# Patient Record
Sex: MALE | Race: WHITE | Employment: STUDENT | URBAN - METROPOLITAN AREA
[De-identification: names, ages, dates, MRNs, and addresses within clinical notes are randomized per-mention and may not be internally consistent; named-entity substitution may affect disease eponyms.]

---
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
## 2024-01-16 ENCOUNTER — TELEMEDICINE (OUTPATIENT)
Dept: FAMILY MEDICINE CLINIC | Facility: CLINIC | Age: 19
End: 2024-01-16
Payer: MEDICAID

## 2024-01-16 DIAGNOSIS — G47.00 INSOMNIA, UNSPECIFIED TYPE: Primary | ICD-10-CM

## 2024-01-16 PROCEDURE — 99213 OFFICE O/P EST LOW 20 MIN: CPT | Performed by: FAMILY MEDICINE

## 2024-01-16 RX ORDER — TRAZODONE HYDROCHLORIDE 100 MG/1
100 TABLET ORAL NIGHTLY
Qty: 90 TABLET | Refills: 0 | Status: SHIPPED | OUTPATIENT
Start: 2024-01-16 | End: 2024-01-16

## 2024-01-16 RX ORDER — TRAZODONE HYDROCHLORIDE 50 MG/1
50 TABLET ORAL NIGHTLY
Qty: 90 TABLET | Refills: 0 | Status: SHIPPED | OUTPATIENT
Start: 2024-01-16 | End: 2024-04-15

## 2024-01-16 NOTE — PROGRESS NOTES
This appointment is conducted via telehealth with live audio and video.     Subjective:   Lucas Zuñiga is a 18 year old male who presents for refill on sleeping medication. Patient states that he takes 50 mg trazodone nightly to help with sleep. Patient states that he does not have any unwanted side effects.       History/Other:    No Further Nursing Notes to Review  Tobacco Reviewed  Allergies   Reviewed  Medications Reviewed  Problem List Reviewed  Medical History   Reviewed  Surgical History Reviewed  Family History Reviewed         Tobacco:  He has never smoked tobacco.    Current Outpatient Medications   Medication Sig Dispense Refill    traZODone 50 MG Oral Tab Take 1 tablet (50 mg total) by mouth nightly. 90 tablet 0    fexofenadine 180 MG Oral Tab Take 1 tablet (180 mg total) by mouth daily.      Multiple Vitamin (MULTI-DAY) Oral Tab Take  by mouth.           Review of Systems:  Review of Systems   Constitutional: Negative.    HENT: Negative.     Eyes: Negative.    Respiratory: Negative.     Cardiovascular: Negative.    Gastrointestinal: Negative.    Genitourinary: Negative.    Musculoskeletal: Negative.    Skin: Negative.    Neurological: Negative.    Psychiatric/Behavioral:  Positive for sleep disturbance.          Objective:   There were no vitals taken for this visit. Estimated body mass index is 25.18 kg/m² as calculated from the following:    Height as of 9/5/23: 5' 6\" (1.676 m).    Weight as of 9/5/23: 156 lb (70.8 kg).  Physical Exam  Constitutional:       Appearance: Normal appearance. He is well-developed.   HENT:      Head: Normocephalic and atraumatic.      Nose: No congestion or rhinorrhea.      Mouth/Throat:      Mouth: Mucous membranes are moist.      Pharynx: Oropharynx is clear.   Eyes:      Conjunctiva/sclera: Conjunctivae normal.   Cardiovascular:      Rate and Rhythm: Normal rate.   Pulmonary:      Effort: Pulmonary effort is normal.   Musculoskeletal:         General:  Normal range of motion.      Cervical back: Normal range of motion.   Skin:     General: Skin is warm and dry.   Neurological:      Mental Status: He is alert and oriented to person, place, and time.      Deep Tendon Reflexes: Reflexes are normal and symmetric.   Psychiatric:         Mood and Affect: Mood normal.         Behavior: Behavior normal.         Thought Content: Thought content normal.         Judgment: Judgment normal.           Assessment & Plan:   1. Insomnia, unspecified type (Primary)  -     traZODone HCl; Take 1 tablet (50 mg total) by mouth nightly.  Dispense: 90 tablet; Refill: 0  Take as prescribed.   Alcohol may have additive effects with this medication do not drink excessively while taking this medication.   Take when you can get a full 8 hours of sleep to reduce the groggy feeling in the morning.     DIANA Lombardo, 1/16/2024, 8:21 AM

## 2024-02-06 ENCOUNTER — HOSPITAL ENCOUNTER (EMERGENCY)
Facility: HOSPITAL | Age: 19
Discharge: HOME | End: 2024-02-06
Attending: PEDIATRICS
Payer: MEDICAID

## 2024-02-06 VITALS
BODY MASS INDEX: 26 KG/M2 | TEMPERATURE: 98 F | RESPIRATION RATE: 18 BRPM | SYSTOLIC BLOOD PRESSURE: 134 MMHG | DIASTOLIC BLOOD PRESSURE: 80 MMHG | HEART RATE: 75 BPM | WEIGHT: 160.06 LBS | OXYGEN SATURATION: 100 %

## 2024-02-06 DIAGNOSIS — K64.9 HEMORRHOIDS, UNSPECIFIED HEMORRHOID TYPE: Primary | ICD-10-CM

## 2024-02-06 PROCEDURE — 99283 EMERGENCY DEPT VISIT LOW MDM: CPT

## 2024-02-06 PROCEDURE — 99282 EMERGENCY DEPT VISIT SF MDM: CPT

## 2024-02-06 NOTE — ED INITIAL ASSESSMENT (HPI)
Last September, patient reports he had a lump that developed around his rectum that spontaneously resolved.  Around 3 days ago, he reports the lump again became more prominent and painful.  It has been bothering him since.  He does report recently he has been having small and infrequent bowel movements, but denies having to strain to push them out.      He also complains of increased dryness and irritation to his eyes, but also reports he just started college for computer science and has as a result been spending more time looking at screens.

## 2024-02-07 NOTE — ED PROVIDER NOTES
Patient Seen in: [facility] Emergency Department      History     Chief Complaint   Patient presents with    Anal Problem     Stated Complaint: Rectal pain    Subjective:   HPI    Patient is a 18-year-old male here with complaint of anal pain.  He states that he feels a lump near his anus.  He states this has happened to him before.  About 5 months ago he was seen by his PMD who thought he had some sort of soft mass near his anus and ordered an ultrasound.  Patient never got this done.  He has noted that over the past few days he feels that lump again and it is coming with straining of his stool.  He denies nacho blood but every now and then notes a little bit of bright red blood on the toilet paper    Objective:   Past Medical History:   Diagnosis Date    Allergic rhinitis     Anxiety     Depression               History reviewed. No pertinent surgical history.             Social History     Socioeconomic History    Marital status: Single   Tobacco Use    Smoking status: Never    Smokeless tobacco: Never   Vaping Use    Vaping Use: Never used   Substance and Sexual Activity    Alcohol use: Never    Drug use: Never   Other Topics Concern    Caffeine Concern No    Exercise No    Seat Belt No    Special Diet No    Stress Concern Yes     Comment: College :)    Weight Concern No              Review of Systems    Positive for stated complaint: Rectal pain  Other systems are as noted in HPI.  Constitutional and vital signs reviewed.      All other systems reviewed and negative except as noted above.    Physical Exam     ED Triage Vitals   BP 02/06/24 1019 134/80   Pulse 02/06/24 1019 79   Resp 02/06/24 1019 20   Temp 02/06/24 1019 98.2 °F (36.8 °C)   Temp src 02/06/24 1019 Temporal   SpO2 02/06/24 1019 100 %   O2 Device 02/06/24 1113 None (Room air)       Current:/80   Pulse 75   Temp 98.2 °F (36.8 °C) (Temporal)   Resp 18   Wt 72.6 kg   SpO2 100%   BMI 25.83 kg/m²         Physical Exam  HEENT: The  pupils are equal round and react to light, oropharynx is clear, mucous membranes are moist.  Ears:left TM shows no erythema, right TM shows no erythema   Neck: Supple, full range of motion.  CV: Chest is clear to auscultation, no wheezes rales or rhonchi.  Cardiac exam normal S1-S2, no murmurs rubs or gallops.  Abdomen: Soft, nontender, nondistended.  Bowel sounds present throughout.  Extremities: Warm and well perfused.  Dermatologic exam: No rashes or lesions.  Neurologic exam: Cranial nerves 2-12 grossly intact.    Orthopedic exam: normal,from.    : External hemorrhoid noted at the 6 o'clock position.  No bleeding no firm palpable mass    ED Course   Labs Reviewed - No data to display          Vitals reviewed within normal limits.  Pulse 79 normal for age         MDM      Patient presents with pain near the anus.  Differential considered includes hemorrhoid versus abscess.  He has a clear hemorrhoid.  This does not require ultrasound.  He will be treated with stool softeners.  If symptoms worsen he is to follow close with the PMD or return for worsening of symptoms to the ED    Patient was screened and evaluated during this visit.   As a treating physician attending to the patient, I determined, within reasonable clinical confidence and prior to discharge, that an emergency medical condition was not or was no longer present.  There was no indication for further evaluation, treatment or admission on an emergency basis.  Comprehensive verbal and written discharge and follow-up instructions were provided to help prevent relapse or worsening.  Patient was instructed to follow-up with the primary care provider for further evaluation and treatment, but to return immediately to the ER for worsening, concerning, new, changing or persisting symptoms.  I discussed the case with the patient/parent and they had no questions, complaints, or concerns.  Patient/parent felt comfortable going home.                                Medical Decision Making      Disposition and Plan     Clinical Impression:  1. Hemorrhoids, unspecified hemorrhoid type         Disposition:  Discharge  2/6/2024 10:33 am    Follow-up:  No follow-up provider specified.        Medications Prescribed:  Discharge Medication List as of 2/6/2024 11:14 AM

## 2024-02-16 ENCOUNTER — TELEMEDICINE (OUTPATIENT)
Dept: FAMILY MEDICINE CLINIC | Facility: CLINIC | Age: 19
End: 2024-02-16
Payer: MEDICAID

## 2024-02-16 DIAGNOSIS — J30.9 ALLERGIC RHINITIS, UNSPECIFIED SEASONALITY, UNSPECIFIED TRIGGER: ICD-10-CM

## 2024-02-16 DIAGNOSIS — R41.89 BRAIN FOG: ICD-10-CM

## 2024-02-16 DIAGNOSIS — R53.83 FATIGUE, UNSPECIFIED TYPE: ICD-10-CM

## 2024-02-16 DIAGNOSIS — G47.00 INSOMNIA, UNSPECIFIED TYPE: Primary | ICD-10-CM

## 2024-02-17 NOTE — PROGRESS NOTES
This is a telemedicine visit with live, interactive video and/or audio.     Patient understands and accepts financial responsibility for any deductible, co-insurance and/or co-pays associated with this service.    SUBJECTIVE  This is a 18 year old male patient is concerned that he has a \"brain tumor because I have several of the symptoms related to brain tumors.\" Patient listed a dozen symptoms that included poor balance, forgetfulness, stuttering, anxiety, memory loss, confusion. Patient states that he is also having headaches, brain fog, fatigue and difficulty sleeping. Patient was given resources previously for neuropsych evaluation but the provider given did not accept his insurance. Patient states that he is still interested in this evaluation.     Results for orders placed or performed in visit on 08/17/23   Adult Food Allergy Prof [E]   Result Value Ref Range    Allergen Clam <0.10 <0.10 kUA/L    Allergen Corn <0.10 <0.10 kUA/L    Allergen Egg White <0.10 <0.10 kUA/L    Allergen Fish <0.10 <0.10 kUA/L    Allergen Milk <0.10 <0.10 kUA/L    Allergen Peanut <0.10 <0.10 kUA/L    Allergen Scallop <0.10 <0.10 kUA/L    Allergen Sesame Seed <0.10 <0.10 kUA/L    Allergen Shrimp <0.10 <0.10 kUA/L    Allergen Soybean <0.10 <0.10 kUA/L    Allergen [place] <0.10 <0.10 kUA/L    Allergen Wheat <0.10 <0.10 kUA/L    Immunoglobulin E 55.70 2.00 - 214.00 kU/L   Allergens, Zone 8 [E]   Result Value Ref Range    Allergen A. Alternata 3.87 (H) <0.10 kUA/L    Allergen Aspergillus Fumigatus <0.10 <0.10 kUA/L    Allergen Bermuda Grass <0.10 <0.10 kUA/L    Allergen Box Elder 0.13 (H) <0.10 kUA/L    Allergen C. Herbarum <0.10 <0.10 kUA/L    Allergen Cat Dander 0.47 (H) <0.10 kUA/L    Allergen Cockroach <0.10 <0.10 kUA/L    Allergen Common Pigweed <0.10 <0.10 kUA/L    Allergen Common Ragweed 5.86 (H) <0.10 kUA/L    Allergen [place] Tree <0.10 <0.10 kUA/L    Allergen D. Farinae <0.10 <0.10 kUA/L    Allergen D. Pteronyssinus <0.10 <0.10  kUA/L    Allergen Dog Dander <0.10 <0.10 kUA/L    Allergen Elm Tree <0.10 <0.10 kUA/L    Allergen Marsh Elder 0.25 (H) <0.10 kUA/L    Allergen Mouse Epithelium <0.10 <0.10 kUA/L    Allergen [place] <0.10 <0.10 kUA/L    Allergen Mucor Racemosus <0.10 <0.10 kUA/L    Allergen [place] <0.10 <0.10 kUA/L    Allergen [place] Tree 0.22 (H) <0.10 kUA/L    Allergen Pecan/Hickory 0.13 (H) <0.10 kUA/L    Allergen Penicillium Notatum <0.10 <0.10 kUA/L    Allergen Russian Thistle <0.10 <0.10 kUA/L    Allergen [place] <0.10 <0.10 kUA/L    Allergen Ashish Grass <0.10 <0.10 kUA/L    Allergen [place] Tree <0.10 <0.10 kUA/L    Allergen White [place] 0.22 (H) <0.10 kUA/L    Immunoglobulin E 52.50 2.00 - 214.00 kU/L   Comp Metabolic Panel (14)   Result Value Ref Range    Glucose 86 70 - 99 mg/dL    Sodium 137 136 - 145 mmol/L    Potassium 3.2 (L) 3.5 - 5.1 mmol/L    Chloride 104 98 - 112 mmol/L    CO2 26.0 21.0 - 32.0 mmol/L    Anion Gap 7 0 - 18 mmol/L    BUN 13 7 - 18 mg/dL    Creatinine 1.21 (H) 0.50 - 1.00 mg/dL    Calcium, Total 9.9 8.5 - 10.1 mg/dL    Calculated Osmolality 283 275 - 295 mOsm/kg    eGFR-Cr 89 >=60 mL/min/1.73m2    AST 16 15 - 37 U/L    ALT 19 16 - 61 U/L    Alkaline Phosphatase 64 52 - 222 U/L    Bilirubin, Total 1.6 0.1 - 2.0 mg/dL    Total Protein 7.4 6.4 - 8.2 g/dL    Albumin 4.6 3.4 - 5.0 g/dL    Globulin  2.8 2.8 - 4.4 g/dL    A/G Ratio 1.6 1.0 - 2.0    Patient Fasting for CMP? Yes    Lipid Panel   Result Value Ref Range    Cholesterol, Total 151 <200 mg/dL    HDL Cholesterol 67 (H) 40 - 59 mg/dL    Triglycerides 73 30 - 149 mg/dL    LDL Cholesterol 70 <100 mg/dL    VLDL 11 0 - 30 mg/dL    Non HDL Chol 84 <130 mg/dL    Patient Fasting for Lipid? Yes    Hemoglobin A1C   Result Value Ref Range    HgbA1C 5.1 <5.7 %    Estimated Average Glucose 100 68 - 126 mg/dL   Iron And Tibc   Result Value Ref Range    Iron 122 65 - 175 ug/dL    Transferrin 263 200 - 360 mg/dL    Total Iron Binding Capacity 392 240 - 450  ug/dL    % Saturation 31 20 - 50 %   Ferritin   Result Value Ref Range    Ferritin 40.2 20.0 - 155.0 ng/mL   Vitamin B12   Result Value Ref Range    Vitamin B12 318 193 - 986 pg/mL   Folic Acid Serum (Folate)   Result Value Ref Range    Folate (Folic Acid) 23.6 >=8.7 ng/mL   Histamine, Whole Blood   Result Value Ref Range    Histamine Determination, Blood 61 12 - 127 ng/mL   C-Reactive Protein   Result Value Ref Range    C-Reactive Protein <0.29 <0.30 mg/dL   Cortisol [E]   Result Value Ref Range    Cortisol 6.5 ug/dL   Testosterone, Total Free, Male [E]   Result Value Ref Range    Free Testost Direct 10.2 Not Estab. pg/mL    Testosterone 495 150 - 785 ng/dL   Estrogens Fractionated, S [E]   Result Value Ref Range    Estrone 76 pg/mL    Estradiol 17.7 7.6 - 42.6 pg/mL   Vitamin D, 25-Hydroxy   Result Value Ref Range    Vitamin D, 25OH, Total 20.6 (L) 30.0 - 100.0 ng/mL   CBC W/ DIFFERENTIAL   Result Value Ref Range    WBC 5.7 4.0 - 11.0 x10(3) uL    RBC 5.49 4.30 - 5.70 x10(6)uL    HGB 17.0 13.0 - 17.5 g/dL    HCT 49.0 39.0 - 53.0 %    .0 150.0 - 450.0 10(3)uL    MCV 89.3 80.0 - 100.0 fL    MCH 31.0 26.0 - 34.0 pg    MCHC 34.7 31.0 - 37.0 g/dL    RDW 11.9 %    Neutrophil Absolute Prelim 2.78 1.50 - 7.70 x10 (3) uL    Neutrophil Absolute 2.78 1.50 - 7.70 x10(3) uL    Lymphocyte Absolute 2.05 1.50 - 5.00 x10(3) uL    Monocyte Absolute 0.59 0.10 - 1.00 x10(3) uL    Eosinophil Absolute 0.17 0.00 - 0.70 x10(3) uL    Basophil Absolute 0.05 0.00 - 0.20 x10(3) uL    Immature Granulocyte Absolute 0.01 0.00 - 1.00 x10(3) uL    Neutrophil % 49.2 %    Lymphocyte % 36.3 %    Monocyte % 10.4 %    Eosinophil % 3.0 %    Basophil % 0.9 %    Immature Granulocyte % 0.2 %       HISTORY:  Past Medical History:   Diagnosis Date    Allergic rhinitis     Anxiety     Depression       History reviewed. No pertinent surgical history.   Family History   Problem Relation Age of Onset    Cancer Mother     Depression Mother       Social  History     Socioeconomic History    Marital status: Single   Tobacco Use    Smoking status: Never    Smokeless tobacco: Never   Vaping Use    Vaping Use: Never used   Substance and Sexual Activity    Alcohol use: Never    Drug use: Never   Other Topics Concern    Caffeine Concern No    Exercise No    Seat Belt No    Special Diet No    Stress Concern Yes     Comment: College :)    Weight Concern No        Allergies   Allergen Reactions    Seasonal OTHER (SEE COMMENTS)      Current Outpatient Medications   Medication Sig Dispense Refill    traZODone 50 MG Oral Tab Take 1 tablet (50 mg total) by mouth nightly. 90 tablet 0    fexofenadine 180 MG Oral Tab Take 1 tablet (180 mg total) by mouth daily.      Multiple Vitamin (MULTI-DAY) Oral Tab Take  by mouth.         OBJECTIVE  Physical Exam:   Speaking in full sentences comfortably and Normal work of breathing    ASSESSMENT & PLAN  Diagnoses and all orders for this visit:    Insomnia, unspecified type  -     Neuro Referral - In Network    Brain fog  -     Neuro Referral - In Network    Fatigue, unspecified type  -     Neuro Referral - In Network    Allergic rhinitis, unspecified seasonality, unspecified trigger    Start daily antihistamine: Claritin, Zyrtec, Allegra are all good options. Generic is fine.  Use nasal spray daily  Avoid allergy triggers  Follow up if symptoms worsen or new onset of fever  What Causes Springtime Allergies?   Spring allergies are a result of pollen from trees, which can start pollinating anytime from January to April, depending on the climate and location. Trees that are known to cause severe allergies include oak, olive, elm, birch, kimberly, hickory, poplar, sycamore, maple, cypress, and walnut. In some areas of the world, some weeds will also pollinate in the springtime.   What Causes Summertime Allergies?   Grass pollen is typically the main cause of late spring and early summer allergies. Grass pollen is highest at these times. However, grass  may cause allergies through much of the year if someone is mowing the lawn or lying in the grass. Contact with grass can result in itching and hives in people who are allergic to grass pollen, this is called contact urticaria.   Grasses can be divided into two major classes, northern and southern grasses. Northern grasses are common in colder climates, and include tano, rye, orchard, sweet vernal, red top, and blue grasses. Southern grasses are present in warmer climates, with Bermuda grass being the major grass in this category.   What Causes Fall Allergies?   Weed pollen is the main cause of seasonal allergy in the late summer and early fall. Depending on the area of [place], these weeds include ragweed, sagebrush, pigweed, tumbleweed (Russian thistle), and cocklebur. In some areas of the world, some trees can pollinate in the fall as well.   How Do I Know What Pollens are Present?   In most areas, pollen is measured and counted, with the different types of pollen identified. This may be reported in terms of trees, weeds and grasses, or may be further divided into the types of trees and weeds identified. Specific grasses are not usually identified on pollen counts, as grasses look the same under a microscope.   How Do I Know Which Pollens I am Allergic To?   An allergist can help determine if you have seasonal allergies, and to which types of pollens to which you are allergic. This is accomplished through allergy testing, which typically involves skin testing or a blood test (RAST). Allergy testing can be helpful in predicting the times of the year that you are likely to experience allergy symptoms and is needed if you are interesting in taking allergy shots.   How Can I Avoid Pollen Exposure?   Unlike avoidance of pet dander and dust mites, it is more difficult to avoid exposure to pollens, since it is present in the outdoor air. Here are some tips to minimize pollen exposure:   Keep windows closed  prevent pollens from drifting into your home   Minimize early morning activity when pollen is usually emitted between 5-10 a.m.   Keep your car windows closed when traveling.   Stay indoors when the pollen count is reported to be high, and on windy days when pollen may be present in higher amounts in the air   Take a vacation during the height of the pollen season to a more pollen-free area, such as the beach or sea.   Avoid freshly cut grass and mowing the lawn   Machine dry bedding and clothing. Pollen may collect in laundry if it is hung outside to dry   Source: American Academy of Allergy, Asthma and Immunology.  http://www.aaaai.org/patients/publicedmat/tips/outdoorallergens.stm.   DISCLAIMER: The information contained in this article is for educational purposes only, and should not be used as a substitute for personal care by a licensed physician. Please see your physician for diagnosis and treatment of any concerning symptoms or medical condition.      Follow Up:  As needed.    Plan discussed with patient. All questions answered. Patient is agreeable to this plan of care.     Time spent on telephone call and encounter 25 min.       DIANA Lombardo

## 2024-03-11 ENCOUNTER — OFFICE VISIT (OUTPATIENT)
Dept: FAMILY MEDICINE CLINIC | Facility: CLINIC | Age: 19
End: 2024-03-11
Payer: MEDICAID

## 2024-03-11 VITALS
HEART RATE: 67 BPM | RESPIRATION RATE: 16 BRPM | DIASTOLIC BLOOD PRESSURE: 74 MMHG | WEIGHT: 157 LBS | BODY MASS INDEX: 25.23 KG/M2 | SYSTOLIC BLOOD PRESSURE: 100 MMHG | TEMPERATURE: 98 F | HEIGHT: 66 IN | OXYGEN SATURATION: 97 %

## 2024-03-11 DIAGNOSIS — Z00.00 WELL ADULT EXAM: Primary | ICD-10-CM

## 2024-03-11 DIAGNOSIS — F51.01 PRIMARY INSOMNIA: ICD-10-CM

## 2024-03-11 DIAGNOSIS — F32.A ANXIETY AND DEPRESSION: ICD-10-CM

## 2024-03-11 DIAGNOSIS — R41.89 BRAIN FOG: ICD-10-CM

## 2024-03-11 DIAGNOSIS — F41.9 ANXIETY AND DEPRESSION: ICD-10-CM

## 2024-03-11 PROBLEM — J30.2 SEASONAL ALLERGIES: Status: ACTIVE | Noted: 2022-08-03

## 2024-03-11 PROBLEM — F33.1 MAJOR DEPRESSIVE DISORDER, RECURRENT EPISODE, MODERATE DEGREE (HCC): Status: ACTIVE | Noted: 2017-01-27

## 2024-03-11 PROBLEM — R41.840 ATTENTION DISTURBANCE: Status: ACTIVE | Noted: 2022-08-03

## 2024-03-11 RX ORDER — HYDROXYZINE 50 MG/1
50 TABLET, FILM COATED ORAL NIGHTLY PRN
Qty: 90 TABLET | Refills: 0 | Status: SHIPPED | OUTPATIENT
Start: 2024-03-11 | End: 2024-06-09

## 2024-03-11 NOTE — PROGRESS NOTES
Note to patient: The 21st Century Cures Act makes medical notes like these available to patients in the interest of transparency. However, be advised this is a medical document. It is intended as peer to peer communication. It is written in medical language and may contain abbreviations or verbiage that are unfamiliar. It may appear blunt or direct. Medical documents are intended to carry relevant information, facts as evident, and the clinical opinion of the practitioner.         Chief Complaint   Patient presents with    Physical       HPI:  Patient here for annual physical.     Sleeping issues - Trazodone often causes him to wake up with mild chest pain. He tried trazodone consistently for the past two months. Took it on and off for past 6 months. He does not fall asleep after taking it sometimes. Notices that when he does take it he has-Right arm often goes numb overnight, despite positioning. Does not notice it when laying flat, but if arms are elevated there is numbness.     Brain fog - poor concentration, short/long term memory, forgets train of thought, poor interpretation skills (whether what is said or situations), manually functions in head (no \"autopilot\"), talks himself through problems vs things just happening, zoning out often. Notices these symptoms daily, but they vary in severity. Neuro referral in place, will call today to set up an appointment.     Digestive issues - recurring trend for past 4-5 years, sometimes will have bad diarrhea. \"Eggy\" burps, alternating constipation. Denies nausea or bloating. Pain in stomach only with constipation. Drinks plenty of water. Relies mostly on dining halls for foods, chooses vegetables whenever they are available.     He does have axiety/depression. He has been on medications before but reports it had no effect on him. He tried wellbutrin, zoloft, lexapro, venlafaxine, strattera. He had no positive benefit from it. He was seeing psychiatrist.     Sees  therapist weekly. Therapist thinks brain fog is related to anxiety.    PMHx: sleeping issues, allergies   PSHx: none  FMHx:  -maternal: breast cancer   -paternal: unknown  Smoking: none  Alcohol: occasionally   Drugs: none   Sexual hx: not active   STD hx: declined  Occupation: student, [employer]  Colonoscopy: no fmhx  Tdap: due 7/2025  Diet: dining halls at college, [employer]  Exercise: not often    PHQ-9 TOTAL SCORE: 18  , done 3/11/2024   Little interest or pleasure in doing things: 3    Feeling down, depressed, or hopeless: 3    Trouble falling or staying asleep, or sleeping too much: 3     Feeling tired or having little energy: 3    Feeling bad about yourself - or that you are a failure or have let yourself or your family down: 3    Trouble concentrating on things, such as reading the newspaper or watching television: 3    If you checked off any problems, how difficult have these problems made it for you to do your work, take care of things at home, or get along with other people?: Very difficult    [facility] Suicide Screening on 3/11/2024 was No Risk.      Review of Systems   Constitutional: Negative for fever, chills. No distress. Reports fatigue.   HENT: Negative for hearing loss, congestion, sore throat, neck pain   Eyes: Negative for pain and visual disturbance.   Respiratory: Negative for cough, chest tightness, shortness of breath and wheezing.    Cardiovascular: Negative for chest pain, palpitations and leg swelling.   Gastrointestinal: Negative for nausea, vomiting, abdominal pain, blood in stool and abdominal distention. Alternating constipation/diarrhea.  Genitourinary: Negative for dysuria, hematuria and difficulty urinating.   Musculoskeletal: Negative for myalgias, back pain, joint swelling, arthralgias and gait problem.   Skin: Negative for color change and rash.   Neurological: Negative for dizziness, syncope, weakness, tingling and headaches.     Patient Active Problem List   Diagnosis     Anxiety and depression    Generalized anxiety disorder    Attention disturbance    Major depressive disorder, recurrent episode, moderate degree (HCC)    Seasonal allergies       Past Medical History:   Diagnosis Date    Allergic rhinitis     Anxiety     Depression      History reviewed. No pertinent surgical history.  Family History   Problem Relation Age of Onset    Cancer Mother     Depression Mother      Social History     Socioeconomic History    Marital status: Single   Tobacco Use    Smoking status: Never    Smokeless tobacco: Never   Vaping Use    Vaping Use: Never used   Substance and Sexual Activity    Alcohol use: Not Currently    Drug use: Never   Other Topics Concern    Caffeine Concern No    Exercise No    Seat Belt No    Special Diet No    Stress Concern Yes     Comment: College :)    Weight Concern No       Current Outpatient Medications   Medication Sig Dispense Refill    hydrOXYzine 50 MG Oral Tab Take 1 tablet (50 mg total) by mouth nightly as needed for Anxiety (insomnia). 90 tablet 0    fexofenadine 180 MG Oral Tab Take 1 tablet (180 mg total) by mouth daily.      Multiple Vitamin (MULTI-DAY) Oral Tab Take  by mouth.         Allergies  Allergies   Allergen Reactions    Seasonal OTHER (SEE COMMENTS)       Health Maintenance  Immunizations:  Immunization History   Administered Date(s) Administered    Covid-19 Vaccine Pfizer 30 mcg/0.3 ml 08/22/2021, 09/12/2021    DTAP INFANRIX 11/08/2004, 01/07/2005, 03/07/2005, 12/19/2005, 01/19/2010    FLUZONE 6 months and older PFS 0.5 ml (53465) 10/07/2020    HEP A,Ped/Adol,(2 Dose) 07/15/2015, 07/20/2016    HIB 3 Dose Schedule 11/08/2004, 01/07/2005, 09/06/2005    Hep B, Unspecified Formulation 11/08/2004, 01/07/2005, 09/06/2005    Hpv Virus Vaccine 9 Laurie Im 07/20/2016, 07/26/2017    IPV 11/08/2004, 01/07/2005, 09/06/2005, 01/19/2010    MMR 09/06/2005, 01/19/2010    Meningococcal Groups A,c,y,w Conjugate Vaccine 08/03/2022    Meningococcal-Menactra 07/20/2016     Pneumococcal (Prevnar 7) 01/07/2005, 03/07/2005, 06/13/2005, 09/06/2005    TDAP 07/15/2015    Varicella 12/19/2005, 01/19/2010         Physical Exam  /74   Pulse 67   Temp 98 °F (36.7 °C) (Temporal)   Resp 16   Ht 5' 6\" (1.676 m)   Wt 157 lb (71.2 kg)   SpO2 97%   BMI 25.34 kg/m²   Constitutional: Oriented to person, place, and time. No distress.   HEENT:  Normocephalic and atraumatic. Hearing and tympanic membranes normal.  Nose normal. Oropharynx is clear and moist.   Eyes: Conjunctivae and EOM are normal. PERRLA. No scleral icterus.   Neck:  No mass and no thyromegaly present.   Cardiovascular: Normal rate, regular rhythm and intact distal pulses.  No murmur, rubs or gallops.   Pulmonary/Chest: Effort normal and breath sounds normal. No respiratory distress. No wheezes, rhonchi or rales  Abdominal: Soft. Bowel sounds are normal. Non tender, no masses, no organomegaly or hernias.  Musculoskeletal: No edema and no tenderness.    Neurological: grossly normal   Skin: Skin is warm and dry.  Psychiatric: Normal mood and affect.     A/P:    1. Brain fog  Patient had previously gotten a referral for Neuro, he plans to call today to schedule an appointment. Would recommend neuropsych testing for ADHD and similar conditions.     - [facility] Referral - In Network    2. Well adult exam  Encouraged healthy diet and physical activity. Counseled on safe sex, screening guidelines, and vaccine recommendations.     3. Primary insomnia  He has been taking trazodone, however due to the side effects of chest pain and inconsistency will adjust medication. He also has anxiety so will do a trial of hydroxyzine and see how this works for him.    - hydrOXYzine 50 MG Oral Tab; Take 1 tablet (50 mg total) by mouth nightly as needed for Anxiety (insomnia).  Dispense: 90 tablet; Refill: 0    4. Anxiety and depression  Referral in place for [facility]. Patient has tried many anti-depressants and did not feel  any benefit from them. Would like their input on medication recommendations.     - [facility] Referral - In Network      Encounter Diagnoses   Name Primary?    Brain fog     Well adult exam Yes    Primary insomnia     Anxiety and depression        No orders of the defined types were placed in this encounter.      Meds & Refills for this Visit:  Requested Prescriptions     Signed Prescriptions Disp Refills    hydrOXYzine 50 MG Oral Tab 90 tablet 0     Sig: Take 1 tablet (50 mg total) by mouth nightly as needed for Anxiety (insomnia).       Imaging & Consults:  [facility] - INTERNAL      No follow-ups on file.    There are no Patient Instructions on file for this visit.    All questions were answered and the patient understands the plan.     LALY Galvez      I have personally seen and examined the patient, I have reviewed the PA student's examination, notes, and agree with the assessment and plan.        Betty Almanzar,         This note was prepared using Dragon Medical voice recognition dictation software. As a result errors may occur. When identified these errors have been corrected. While every attempt is made to correct errors during dictation discrepancies may still exist.      Note to patient: The 21st Century Cures Act makes medical notes like these available to patients in the interest of transparency. However, be advised this is a medical document. It is intended as peer to peer communication. It is written in medical language and may contain abbreviations or verbiage that are unfamiliar. It may appear blunt or direct. Medical documents are intended to carry relevant information, facts as evident, and the clinical opinion of the practitioner.

## 2024-03-15 ENCOUNTER — TELEPHONE (OUTPATIENT)
Age: 19
End: 2024-03-15

## 2024-03-15 NOTE — TELEPHONE ENCOUNTER
Juan Alvarado MD  [facility]  [address], [address], [place], [address]  Phone: [phone]    NELLIE French  [facility]  [address] [place], [place], [address]  Phone: [phone]    Boby Scott MD  [address], [address]  Phone: [phone]    KELLEN Larson  [facility]  [address], [address], [place], [place], [address]  Phone: [phone]   Purse String (Intermediate) Text: Given the location of the defect and the characteristics of the surrounding skin a purse string intermediate closure was deemed most appropriate.  Undermining was performed circumfirentially around the surgical defect.  A purse string suture was then placed and tightened.

## 2024-06-02 ENCOUNTER — HOSPITAL ENCOUNTER (OUTPATIENT)
Age: 19
Discharge: HOME | End: 2024-06-02
Payer: MEDICAID

## 2024-06-02 VITALS
SYSTOLIC BLOOD PRESSURE: 117 MMHG | DIASTOLIC BLOOD PRESSURE: 77 MMHG | HEART RATE: 65 BPM | OXYGEN SATURATION: 98 % | TEMPERATURE: 98 F | RESPIRATION RATE: 18 BRPM

## 2024-06-02 DIAGNOSIS — L03.119 CELLULITIS OF LOWER EXTREMITY, UNSPECIFIED LATERALITY: Primary | ICD-10-CM

## 2024-06-02 RX ORDER — CEPHALEXIN 500 MG/1
500 CAPSULE ORAL 3 TIMES DAILY
Qty: 21 CAPSULE | Refills: 0 | Status: SHIPPED | OUTPATIENT
Start: 2024-06-02 | End: 2024-06-09

## 2024-06-02 NOTE — DISCHARGE INSTRUCTIONS
Follow-up with [facility] tomorrow for further evaluation of your wound  Start the antibiotics 3 times daily for the next 7 days  Keep the area clean and dry  Return to the emergency room if any worsening symptoms or concerns

## 2024-06-02 NOTE — ED PROVIDER NOTES
Patient Seen in: [facility]      History     Chief Complaint   Patient presents with    Cellulitis     Stated Complaint: left leg suture check/post surgery    Subjective:   HPI    18-year-old male presents to the immediate care for a wound check of the left leg.  Patient had a procedure done at dermatology and asked the start to get infected has increased redness and swelling to the area.  Is post get the sutures removed tomorrow by the dermatology department.  No fever no drainage from the wound.  Patient is no other issues complaints concerns.  The patient's medication list, past medical history and social history elements as listed in today's nurse's notes were reviewed and agreed (except as otherwise stated in the HPI).  The patient's family history reviewed and determined to be noncontributory to the presenting problem.      Objective:   Past Medical History:    Allergic rhinitis    Anxiety    Depression              Past Surgical History:   Procedure Laterality Date    Skin biopsy Left 05/20/2024    mole removal on left lower leg- benign    [place] teeth removed Bilateral     2024                Social History     Socioeconomic History    Marital status: Single   Tobacco Use    Smoking status: Never    Smokeless tobacco: Never   Vaping Use    Vaping status: Never Used   Substance and Sexual Activity    Alcohol use: Not Currently    Drug use: Never   Other Topics Concern    Caffeine Concern No    Exercise No    Seat Belt No    Special Diet No    Stress Concern Yes     Comment: College :)    Weight Concern No     Social Determinants of Health     Financial Resource Strain: Not on File (10/7/2022)    Received from [facility]     Financial Resource Strain     Financial Resource Strain: 0   Food Insecurity: Not on File (10/7/2022)    Received from [facility]     Food Insecurity     Food: 0   Transportation Needs: Not on File (10/7/2022)    Received from [facility]     Transportation Needs      Transportation: 0   Physical Activity: Not on File (10/7/2022)    Received from [facility]     Physical Activity     Physical Activity: 0   Stress: Not on File (10/7/2022)    Received from [facility]     Stress     Stress: 0   Social Connections: Not on File (10/7/2022)    Received from [facility]     Social Connections     Social Connections and Isolation: 0    [facility] from [facility], [facility]    Housing Stability              Review of Systems    Positive for stated complaint: left leg suture check/post surgery  Other systems are as noted in HPI.  Constitutional and vital signs reviewed.      All other systems reviewed and negative except as noted above.    Physical Exam     ED Triage Vitals [06/02/24 1135]   /77   Pulse 65   Resp 18   Temp 98.4 °F (36.9 °C)   Temp src Temporal   SpO2 98 %   O2 Device None (Room air)       Current Vitals:   Vital Signs  BP: 117/77  Pulse: 65  Resp: 18  Temp: 98.4 °F (36.9 °C)  Temp src: Temporal    Oxygen Therapy  SpO2: 98 %  O2 Device: None (Room air)            Physical Exam    GENERAL: The patient is well-developed well-nourished nontoxic, non-ill-appearing  CHEST/LUNGS:  There is no respiratory distress noted.  HEART/CARDIOVASCULAR:  There is no tachycardia.   SKIN: Left medial calf there is a intact sutures with mild redness around the suture sites and increasing red streaks going proximal and distal to the sutures.  Area is warm to touch.  No drainage noted  NEURO: The patient is awake, alert, and oriented.  The patient is cooperative.    ED Course   Labs Reviewed - No data to display           MDM   Pertinent Labs & Imaging studies reviewed. (See chart for details)  Differential diagnosis considered but not limited to: Cellulitis, abscess, dehisced wound, normal wound check  Patient coming in with wound redness and swelling. Will treat for possible cellulitis. Will discharge on Keflex. Patient is comfortable with  this plan.  Overall Pt looks good. Non-toxic, well-hydrated and in no respiratory distress. Vital signs are reassuring. Exam is reassuring. I do not believe pt  requires and additional  diagnostic studiesor intervention. I believe pt  can be discharged home to continue evaluation as an outpatient. Follow-up provider given. Discharge instructions given and reviewed. Return for any problems. All understand and agreewith the plan.    Please note that this report has been produced using speech recognition software and may contain errors related to that system including, but not limited to, errors in grammar, punctuation, and spelling, as well as words and phrases that possibly may have been recognized inappropriately.  If there are any questions or concerns, contact the dictating provider for clarification.    Note to patient: The 21st Century Cures Act makes medical notes like these available to patients in the interest of transparency. However, this is a medical document intended as peer to peer communication. It is written in medical language and may contain abbreviations or verbiage that are unfamiliar. It may appear blunt or direct. Medical documents are intended to carry relevant information, facts as evident, and the clinical opinion of the practitioner.                                    Medical Decision Making      Disposition and Plan     Clinical Impression:  1. Cellulitis of lower extremity, unspecified laterality         Disposition:  Discharge  6/2/2024 11:51 am    Follow-up:  Betty Almanzar DO  [address] [address] [address]  [phone]                Medications Prescribed:  Discharge Medication List as of 6/2/2024 11:54 AM        START taking these medications    Details   cephalexin 500 MG Oral Cap Take 1 capsule (500 mg total) by mouth 3 (three) times daily for 7 days., Normal, Disp-21 capsule, R-0

## 2024-06-02 NOTE — ED INITIAL ASSESSMENT (HPI)
Patient had mole removed on 5/20/24 per derm. He is supposed to get the sutures removed tomorrow. He is concerned because there is redness that is increasing with increasing pain in the last 2 days. He is worried he may have an infection.

## 2024-07-18 ENCOUNTER — OFFICE VISIT (OUTPATIENT)
Dept: NEUROLOGY | Facility: CLINIC | Age: 19
End: 2024-07-18
Payer: MEDICAID

## 2024-07-18 VITALS
RESPIRATION RATE: 16 BRPM | SYSTOLIC BLOOD PRESSURE: 118 MMHG | DIASTOLIC BLOOD PRESSURE: 68 MMHG | HEART RATE: 115 BPM | BODY MASS INDEX: 26.49 KG/M2 | OXYGEN SATURATION: 98 % | WEIGHT: 159 LBS | HEIGHT: 65 IN

## 2024-07-18 DIAGNOSIS — R41.3 MEMORY LOSS: Primary | ICD-10-CM

## 2024-07-18 DIAGNOSIS — G47.00 INSOMNIA, UNSPECIFIED TYPE: ICD-10-CM

## 2024-07-18 PROCEDURE — 99204 OFFICE O/P NEW MOD 45 MIN: CPT | Performed by: OTHER

## 2024-07-18 RX ORDER — FINASTERIDE 1 MG/1
1 TABLET, FILM COATED ORAL DAILY
COMMUNITY
Start: 2024-06-21 | End: 2024-09-19

## 2024-07-18 NOTE — PROGRESS NOTES
Neurology History & Physical     ASSESSMENT & PLAN:      ICD-10-CM    1. Memory loss  R41.3 PSYCHOLOGY - INTERNAL      2. Insomnia, unspecified type  G47.00 PULMONARY - INTERNAL        Chronic, relatively static \"brain fog\" - memory, concentration, word finding difficulties.  He is still doing pretty well in school.  Labs unremarkable.  No indication for neuroimaging.  Obtain neuropsych testing, and I advised them to get results from 4-5 years ago to compare.    Chronic insomnia.  Sleep evaluation.    Follow up 3-4 weeks after neuropsych testing.    No follow-ups on file.       ~~~~~~~~~~~~~~~~~~~~~~~~~~~    CHIEF COMPLAINT / REASON FOR VISIT:    Chief Complaint   Patient presents with    Neurologic Problem     PT stated he has had brain fog for the past 5 years he feels as if he is always zoned   Memory not so great he stated he has trouble understanding what people are saying sometimes.     HISTORY OBTAINED FROM:  Patient and mom  Chart review    HISTORY:  Lucas Zuñiga is a 18 year old male with 5 years of concentration, reading and verbal comprehension, and memory difficulty.  There is word retrieval difficulty when speaking.  This is relatively static.  He is currently in college, doing computer science.  Finished freshman year, 3.4 GPA.  He did AP/Honors classes in high school.  He reports having had neuropsych testing 4-5 years ago.  Also has insomnia.    No headaches other than occ tension headaches.  No limb numbness or weakness, no vision or hearing changes.  No dizziness, no balance or gait issues, though he may stumble from time to time.     DATA REVIEWED:  As documented in the history    Aug 2023  TSH, Folate, B12 neg    PHYSICAL EXAMINATION:  /68   Pulse 115   Resp 16   Ht 65\"   Wt 159 lb (72.1 kg)   SpO2 98%   BMI 26.46 kg/m²     Gen: in NAD  MSE: AAOx3, nl language, 3/3 object recall at 5 min, slightly reduced attn/conc (1 error in serial 7s), nl fund of knowledge  CN: PERRL, VFF; EOMI,  no nystagmus; nl facial mvmt bilaterally; nl hearing bilaterally; nl palate elevation bilaterally; nl voice; nl shoulder shrug b/I; nl tongue movement  Motor: 5/5 x4; no drift; normal tone; no abnormal movements  Sensory: nl vibration x4  Coord: nl FTN b/I  Reflex: 2+ BUE and BLE  Gait: normal    Allergies   Allergen Reactions    Seasonal OTHER (SEE COMMENTS)       Current medications:   finasteride 1 MG Oral Tab Take 1 tablet (1 mg total) by mouth daily.      fexofenadine 180 MG Oral Tab Take 1 tablet (180 mg total) by mouth daily.      Multiple Vitamin (MULTI-DAY) Oral Tab Take  by mouth.         Past Medical History:    Allergic rhinitis    Anxiety    Depression       Past Surgical History:   Procedure Laterality Date    Skin biopsy Left 05/20/2024    mole removal on left lower leg- benign    [place] teeth removed Bilateral     2024       Social History     Socioeconomic History    Marital status: Single   Tobacco Use    Smoking status: Never    Smokeless tobacco: Never   Vaping Use    Vaping status: Never Used   Substance and Sexual Activity    Alcohol use: Not Currently    Drug use: Never   Other Topics Concern    Caffeine Concern No    Exercise No    Seat Belt No    Special Diet No    Stress Concern Yes     Comment: College :)    Weight Concern No       Family History   Problem Relation Age of Onset    Cancer Mother     Depression Mother          Aubree Nuñez MD, FAES, FAAN  Board-Certified in Neurology, Epilepsy, and Clinical Neurophysiology  [facility]

## 2024-07-18 NOTE — PATIENT INSTRUCTIONS
Please call to schedule memory testing (neuropsychological testing)    Please call my office once you schedule the memory testing - to see me 3-4 weeks after memory testing is done.      Refill policies:    Allow 2-3 business days for refills; controlled substances may take longer.  Contact your pharmacy at least 5 days prior to running out of medication and have them send an electronic request or submit request through the “request refill” option in your [employer] account.  Refills are not addressed on weekends; covering physicians do not authorize routine medications on weekends.  No narcotics or controlled substances are refilled after noon on Fridays or by on call physicians.  By law, narcotics must be electronically prescribed.  A 30 day supply with no refills is the maximum allowed.  If your prescription is due for a refill, you may be due for a follow up appointment.  To best provide you care, patients receiving routine medications need to be seen at least once a year.  Patients receiving narcotic/controlled substance medications need to be seen at least once every 3 months.  In the event that your preferred pharmacy does not have the requested medication in stock (e.g. Backordered), it is your responsibility to find another pharmacy that has the requested medication available.  We will gladly send a new prescription to that pharmacy at your request.    Scheduling Tests:    If your physician has ordered radiology tests such as MRI or CT scans, please contact Central Scheduling at [phone] right away to schedule the test.  Once scheduled, the [facility] Centralized Referral Team will work with your insurance carrier to obtain pre-certification or prior authorization.  Depending on your insurance carrier, approval may take 3-10 days.  It is highly recommended patients assure they have received an authorization before having a test performed.  If test is done without insurance authorization, patient may be  responsible for the entire amount billed.      Precertification and Prior Authorizations:  If your physician has recommended that you have a procedure or additional testing performed the [facility] Centralized Referral Team will contact your insurance carrier to obtain pre-certification or prior authorization.    You are strongly encouraged to contact your insurance carrier to verify that your procedure/test has been approved and is a COVERED benefit.  Although the [facility] Centralized Referral Team does its due diligence, the insurance carrier gives the disclaimer that \"Although the procedure is authorized, this does not guarantee payment.\"    Ultimately the patient is responsible for payment.   Thank you for your understanding in this matter.  Paperwork Completion:  If you require FMLA or disability paperwork for your recovery, please make sure to either drop it off or have it faxed to our office at [phone]. Be sure the form has your name and date of birth on it.  The form will be faxed to our Forms Department and they will complete it for you.  There is a 25$ fee for all forms that need to be filled out.  Please be aware there is a 10-14 day turnaround time.  You will need to sign a release of information (BENJAMIN) form if your paperwork does not come with one.  You may call the Forms Department with any questions at [phone].  Their fax number is [phone].

## 2024-08-11 ENCOUNTER — HOSPITAL ENCOUNTER (OUTPATIENT)
Age: 19
Discharge: HOME | End: 2024-08-11
Payer: MEDICAID

## 2024-08-11 ENCOUNTER — APPOINTMENT (OUTPATIENT)
Dept: GENERAL RADIOLOGY | Age: 19
End: 2024-08-11
Attending: PHYSICIAN ASSISTANT
Payer: MEDICAID

## 2024-08-11 VITALS
TEMPERATURE: 98 F | SYSTOLIC BLOOD PRESSURE: 116 MMHG | HEIGHT: 65 IN | RESPIRATION RATE: 16 BRPM | OXYGEN SATURATION: 100 % | WEIGHT: 150 LBS | BODY MASS INDEX: 24.99 KG/M2 | HEART RATE: 87 BPM | DIASTOLIC BLOOD PRESSURE: 70 MMHG

## 2024-08-11 DIAGNOSIS — D69.6 THROMBOCYTOPENIA (HCC): ICD-10-CM

## 2024-08-11 DIAGNOSIS — R42 DIZZINESS: Primary | ICD-10-CM

## 2024-08-11 LAB
#MXD IC: 0.9 X10ˆ3/UL (ref 0.1–1)
ATRIAL RATE: 59 BPM
BILIRUB UR QL STRIP: NEGATIVE
BUN BLD-MCNC: 13 MG/DL (ref 7–18)
CHLORIDE BLD-SCNC: 101 MMOL/L (ref 98–112)
CLARITY UR: CLEAR
CO2 BLD-SCNC: 26 MMOL/L (ref 21–32)
COLOR UR: YELLOW
CREAT BLD-MCNC: 1.2 MG/DL
EGFRCR SERPLBLD CKD-EPI 2021: 89 ML/MIN/1.73M2 (ref 60–?)
GLUCOSE BLD-MCNC: 93 MG/DL (ref 70–99)
GLUCOSE UR STRIP-MCNC: NEGATIVE MG/DL
HCT VFR BLD AUTO: 46 %
HCT VFR BLD CALC: 46 %
HGB BLD-MCNC: 15.7 G/DL
HGB UR QL STRIP: NEGATIVE
ISTAT IONIZED CALCIUM FOR CHEM 8: 1.27 MMOL/L (ref 1.12–1.32)
KETONES UR STRIP-MCNC: NEGATIVE MG/DL
LEUKOCYTE ESTERASE UR QL STRIP: NEGATIVE
LYMPHOCYTES # BLD AUTO: 1.4 X10ˆ3/UL (ref 1.5–5)
LYMPHOCYTES NFR BLD AUTO: 25.2 %
MCH RBC QN AUTO: 30.1 PG (ref 26–34)
MCHC RBC AUTO-ENTMCNC: 34.1 G/DL (ref 31–37)
MCV RBC AUTO: 88.1 FL (ref 80–100)
MIXED CELL %: 16.7 %
NEUTROPHILS # BLD AUTO: 3.3 X10ˆ3/UL (ref 1.5–7.7)
NEUTROPHILS NFR BLD AUTO: 58.1 %
NITRITE UR QL STRIP: NEGATIVE
P AXIS: 58 DEGREES
P-R INTERVAL: 118 MS
PH UR STRIP: 6 [PH]
PLATELET # BLD AUTO: 133 X10ˆ3/UL (ref 150–450)
POTASSIUM BLD-SCNC: 4.1 MMOL/L (ref 3.6–5.1)
PROT UR STRIP-MCNC: NEGATIVE MG/DL
Q-T INTERVAL: 404 MS
QRS DURATION: 116 MS
QTC CALCULATION (BEZET): 399 MS
R AXIS: 80 DEGREES
RBC # BLD AUTO: 5.22 X10ˆ6/UL
SODIUM BLD-SCNC: 140 MMOL/L (ref 136–145)
SP GR UR STRIP: >=1.03
T AXIS: 72 DEGREES
TROPONIN I BLD-MCNC: <0.02 NG/ML
UROBILINOGEN UR STRIP-ACNC: <2 MG/DL
VENTRICULAR RATE: 59 BPM
WBC # BLD AUTO: 5.6 X10ˆ3/UL (ref 4–11)

## 2024-08-11 PROCEDURE — 85025 COMPLETE CBC W/AUTO DIFF WBC: CPT | Performed by: PHYSICIAN ASSISTANT

## 2024-08-11 PROCEDURE — 80047 BASIC METABLC PNL IONIZED CA: CPT | Performed by: PHYSICIAN ASSISTANT

## 2024-08-11 PROCEDURE — 93000 ELECTROCARDIOGRAM COMPLETE: CPT | Performed by: PHYSICIAN ASSISTANT

## 2024-08-11 PROCEDURE — 81002 URINALYSIS NONAUTO W/O SCOPE: CPT | Performed by: PHYSICIAN ASSISTANT

## 2024-08-11 PROCEDURE — 84484 ASSAY OF TROPONIN QUANT: CPT | Performed by: PHYSICIAN ASSISTANT

## 2024-08-11 PROCEDURE — 71046 X-RAY EXAM CHEST 2 VIEWS: CPT | Performed by: PHYSICIAN ASSISTANT

## 2024-08-11 PROCEDURE — 99214 OFFICE O/P EST MOD 30 MIN: CPT | Performed by: PHYSICIAN ASSISTANT

## 2024-08-11 RX ORDER — SODIUM CHLORIDE 9 MG/ML
1000 INJECTION, SOLUTION INTRAVENOUS ONCE
Status: COMPLETED | OUTPATIENT
Start: 2024-08-11 | End: 2024-08-11

## 2024-08-11 NOTE — DISCHARGE INSTRUCTIONS
Follow up with your primary doctor.  You should schedule a follow-up visit by the end of this week.    If you have new, changing or worsening symptoms, please go directly to the ER.

## 2024-08-11 NOTE — ED INITIAL ASSESSMENT (HPI)
Pt c/o feeling dizzy a couple of days ago. Then started to feel chest pain and bilateral shoulder pain for the past 30 hours. Also feeling light headed and nauseated.

## 2024-08-11 NOTE — ED PROVIDER NOTES
Patient Seen in: [facility]      History     Chief Complaint   Patient presents with    Chest Pain    Dizziness     Stated Complaint: chest and bilateral shoulder pain lightheaded fatigued    Subjective:   HPI    CHIEF COMPLAINT: Dizziness     HISTORY OF PRESENT ILLNESS: Patient is a 18-year-old male accompanied by his mother for evaluation of dizziness.  He states 30 hours ago he had an episode of vertigo where he felt the room was spinning.  Lasted about 5 minutes and resolved spontaneously.  He has had some intermittent lightheadedness since then.  Also reports he has bilateral shoulder pain and pain under the armpits.  Took finasteride for 4 days for hair loss as prescribed by his dermatologist.  Was concerned about side effects, specifically risk of breast cancer, so he states he discontinued.  Reporting some fatigue.  Denies shortness of breath.  No chest pain.  No vomiting or diarrhea.  Since the original episode of vertigo he has not had any other room spinning sensation.  States he feels occasionally lightheaded.  His mom supplements the history, stating he is awake all night, goes to bed at 5 in the morning and wakes up at 5 in the evening.  She also reports he only eats 1 meal daily around 9 PM.  She is concerned that these things are having an effect on his physical health.     REVIEW OF SYSTEMS:  Constitutional: no fever, no chills  Eyes: no discharge  ENT: no sore throat  Cardiovascular: no chest pain, no palpitations  Respiratory: no cough, no shortness of breath  Gastrointestinal: no abdominal pain, no vomiting  Genitourinary: no hematuria  Musculoskeletal: no back pain  Skin: no rashes  Neurological: no headache     Otherwise a complete review of systems was obtained and other than the HPI was negative     The patient's medication list, past medical history and social history elements is as listed in today's nurse's notes are reviewed and agree. The patient's family history is reviewed  and is noncontributory to the presenting problem, except as indicated as above.    Objective:   Past Medical History:    Allergic rhinitis    Anxiety    Depression              Past Surgical History:   Procedure Laterality Date    Skin biopsy Left 05/20/2024    mole removal on left lower leg- benign    [place] teeth removed Bilateral     2024                Social History     Socioeconomic History    Marital status: Single   Tobacco Use    Smoking status: Never    Smokeless tobacco: Never   Vaping Use    Vaping status: Never Used   Substance and Sexual Activity    Alcohol use: Not Currently    Drug use: Never   Other Topics Concern    Caffeine Concern No    Exercise No    Seat Belt No    Special Diet No    Stress Concern Yes     Comment: College :)    Weight Concern No     Social Determinants of Health     Financial Resource Strain: Not on File (10/7/2022)    [facility] from [facility]    Financial Resource Strain     Financial Resource Strain: 0   Food Insecurity: Not on File (10/7/2022)    Received from [facility]    Food Insecurity     Food: 0   Transportation Needs: Not on File (10/7/2022)    Received from [facility]    Transportation Needs     Transportation: 0   Physical Activity: Not on File (10/7/2022)    Received from [facility]    Physical Activity     Physical Activity: 0   Stress: Not on File (10/7/2022)    Received from [facility]    Stress     Stress: 0   Social Connections: Not on File (10/7/2022)    Received from [facility]    Social Connections     Social Connections and Isolation: 0    [facility]    Housing Stability              Review of Systems    Positive for stated Chief Complaint: Chest Pain and Dizziness    Other systems are as noted in HPI.  Constitutional and vital signs reviewed.      All other systems reviewed and negative except as noted above.    Physical Exam     ED Triage Vitals [08/11/24 0921]   /76    Pulse 74   Resp 16   Temp 98 °F (36.7 °C)   Temp src Temporal   SpO2 100 %   O2 Device None (Room air)       Current Vitals:   Vital Signs  BP: 116/70  Pulse: 87 (HR between 70-89 while standing. no dizziness)  Resp: 16  Temp: 98 °F (36.7 °C)  Temp src: Temporal    Oxygen Therapy  SpO2: 100 %  O2 Device: None (Room air)            Physical Exam    Vital signs and nursing notes reviewed  General Appearance: Patient is alert and oriented x4 in no acute distress  Eyes: pupils equal and round, reactive to light. No pallor or injection, no sclera icterus  Ears: Bilateral TMs and canals clear  Throat: There is no erythema or exudates, no tonsillar hypertrophy.  no trismus or stridor. Uvula midline. No phonation changes, patient handling secretions well. Mucous membranes moist.  Respiratory: there are no retractions, lungs are clear to auscultation  Cardiovascular: regular rate and rhythm  Gastrointestinal:  abdomen is soft and non tender, no masses, bowel sounds normal.  No rebound, guarding or rigidity noted.  No abdominal distention. No pulsatile masses.  Neurological:  Grossly intact, no deficits.  Cranial nerves are intact, 5 out of 5 symmetric upper and lower extremity motor strength. Gait normal.  Skin:  warm and dry, no rashes.  No jaundice. Brisk capillary refill  Musculoskeletal: neck is supple, no lymphadenopathy, non tender.  No carotid bruits bilaterally.  No meningeal signs.  Some reproducible tenderness to palpation of the bilateral anterior shoulders and into the right axilla.  No overlying skin changes.  Extremities are symmetrical, full range of motion  Psychiatric: calm and cooperative    ED Course       Differential diagnosis BPPV, dehydration, cardiac arrhythmia, anemia  Labs Reviewed   POCT CBC - Abnormal; Notable for the following components:       Result Value    PLT .0 (*)     # Lymphocyte 1.4 (*)     All other components within normal limits   POCT ISTAT CHEM8 CARTRIDGE - Normal   ISTAT  TROPONIN - Normal   [facility] POCT URINALYSIS DIPSTICK        Rate, intervals and axes as noted on EKG Report.  Rate: 59 bpm  Rhythm: Sinus Rhythm  Reading: Sinus bradycardia, no evidence of acute ischemia, when compared to previous EKG from 4/2022, no significant changes noted     XR CHEST PA + LAT CHEST (CPT=71046)    Result Date: 8/11/2024  PROCEDURE:  XR CHEST PA + LAT CHEST (CPT=71046)  INDICATIONS:  chest and bilateral shoulder pain lightheaded fatigued  COMPARISON:  [facility], XR, CHEST PA   LATERAL, 2/04/2011, 12:40 PM.  TECHNIQUE:  PA and lateral chest radiographs were obtained.  PATIENT STATED HISTORY: (As transcribed by Technologist)  Patient states he has had intermittent dizziness, chest pain and bilateral shoulder pain for the past 1 1/2 days.    FINDINGS:  The cardiomediastinal silhouette is within normal limits.  There is no consolidation, effusion, or pneumothorax.  No aggressive osseous lesions are identified.            CONCLUSION: No acute cardiopulmonary abnormality.   LOCATION:  [place]   Dictated by (CST): Eugene Loo MD on 8/11/2024 at 11:16 AM     Finalized by (CST): Eugene Loo MD on 8/11/2024 at 11:17 AM        I personally reviewed the chest x-ray images.  No consolidation appreciated.         MDM      This is a well-appearing 18-year-old male presenting for evaluation of lightheadedness over the last 2 days.  Patient had an EKG which showed sinus bradycardia without evidence of acute ischemia.  When compared to previous EKG no significant change was noted.  Patient had an IV line established and blood work was performed.  I-STAT normal.  CBC the shows a normal white blood cell count, hemoglobin and hematocrit.  Platelet count slightly low at 133.  Troponin negative.  Urinalysis negative.  Patient was sent for chest x-ray which shows no consolidation or cardiomegaly.  Received a 1 L fluid bolus.  Orthostatic blood pressures were obtained prior to the fluid bolus and were  within normal limits.  Patient states he felt better after receiving the fluid bolus.  Discussed the results with the patient.  Recommend the patient try to get back on a normal sleep schedule and start eating multiple meals per day.  Fluids and physical activity.  Recommend follow-up with primary care.  If there are any new, changing or worsening symptoms go to the ER.  Patient voiced understanding to the treatment plan.  All questions answered.  This case was discussed with Dr. Grijalva, physician covering immediate care, who agrees with the disposition and plan.                                   Medical Decision Making  Amount and/or Complexity of Data Reviewed  Independent Historian: parent  Labs: ordered. Decision-making details documented in ED Course.  Radiology: ordered and independent interpretation performed. Decision-making details documented in ED Course.        Disposition and Plan     Clinical Impression:  1. Dizziness    2. Thrombocytopenia (HCC)         Disposition:  Discharge  8/11/2024 11:52 am    Follow-up:  Betty Almanzar DO  [address] [address]  [phone]    In 2 days            Medications Prescribed:  Discharge Medication List as of 8/11/2024 11:53 AM

## 2024-10-01 ENCOUNTER — HOSPITAL ENCOUNTER (OUTPATIENT)
Age: 19
Discharge: HOME | End: 2024-10-01
Payer: MEDICAID

## 2024-10-01 VITALS
TEMPERATURE: 98 F | OXYGEN SATURATION: 98 % | DIASTOLIC BLOOD PRESSURE: 70 MMHG | HEART RATE: 82 BPM | RESPIRATION RATE: 18 BRPM | SYSTOLIC BLOOD PRESSURE: 116 MMHG

## 2024-10-01 DIAGNOSIS — J32.9 SINOBRONCHITIS: Primary | ICD-10-CM

## 2024-10-01 DIAGNOSIS — J40 SINOBRONCHITIS: Primary | ICD-10-CM

## 2024-10-01 PROCEDURE — 99213 OFFICE O/P EST LOW 20 MIN: CPT | Performed by: NURSE PRACTITIONER

## 2024-10-01 RX ORDER — BENZONATATE 100 MG/1
100 CAPSULE ORAL 3 TIMES DAILY PRN
Qty: 30 CAPSULE | Refills: 0 | Status: SHIPPED | OUTPATIENT
Start: 2024-10-01 | End: 2024-10-31

## 2024-10-01 RX ORDER — METHYLPREDNISOLONE 4 MG
TABLET, DOSE PACK ORAL
Qty: 1 EACH | Refills: 0 | Status: SHIPPED | OUTPATIENT
Start: 2024-10-01

## 2024-10-01 NOTE — ED PROVIDER NOTES
Patient Seen in: [facility]      History     Chief Complaint   Patient presents with    Cough/URI    Ear Problem Pain     Stated Complaint: coughing runny nose sore throat    Subjective:   19-year-old male presents today with worsening of URI symptoms and cough over the last 2 weeks.  Denies any fever chills has had generalized bodyaches fatigue.  Patient reports sore throat.  No vomiting diarrhea.  Alert orientated x 3.  No other symptoms or concerns.  The patient's medication list, past medical history and social history elements as listed in today's nurse's notes were reviewed and agreed (except as otherwise stated in the HPI).  The patient's family history reviewed and determined to be noncontributory to the presenting problem            Objective:     Past Medical History:    Allergic rhinitis    Anxiety    Depression              Past Surgical History:   Procedure Laterality Date    Skin biopsy Left 05/20/2024    mole removal on left lower leg- benign    [place] teeth removed Bilateral     2024                Social History     Socioeconomic History    Marital status: Single   Tobacco Use    Smoking status: Never    Smokeless tobacco: Never   Vaping Use    Vaping status: Never Used   Substance and Sexual Activity    Alcohol use: Not Currently    Drug use: Never   Other Topics Concern    Caffeine Concern No    Exercise No    Seat Belt No    Special Diet No    Stress Concern Yes     Comment: College :)    Weight Concern No     Social Determinants of Health     Financial Resource Strain: Not on File (10/7/2022)    [facility] from [facility]    Financial Resource Strain     Financial Resource Strain: 0   Food Insecurity: Not on File (9/26/2024)    Received from [facility]    Food Insecurity     Food: 0   Transportation Needs: Not on File (10/7/2022)    Received from [facility]    Transportation Needs     Transportation: 0   Physical Activity: Not on File (10/7/2022)    Received from [facility]     Physical Activity     Physical Activity: 0   Stress: Not on File (10/7/2022)    Received from [facility]    Stress     Stress: 0   Social Connections: Not on File (9/13/2024)    Received from [facility]    Social Connections     Connectedness: 0    Received from [facility], [facility]    Housing Stability              Physical Exam     ED Triage Vitals [10/01/24 1753]   /70   Pulse 82   Resp 18   Temp 98 °F (36.7 °C)   Temp src Temporal   SpO2 98 %   O2 Device None (Room air)       Current Vitals:   Vital Signs  BP: 116/70  Pulse: 82  Resp: 18  Temp: 98 °F (36.7 °C)  Temp src: Temporal    Oxygen Therapy  SpO2: 98 %  O2 Device: None (Room air)        Physical Exam  Vitals and nursing note reviewed.   Constitutional:       Appearance: He is well-developed.   HENT:      Head: Normocephalic.      Right Ear: Tympanic membrane and ear canal normal.      Left Ear: Tympanic membrane and ear canal normal.      Nose: Mucosal edema, congestion and rhinorrhea present.      Mouth/Throat:      Pharynx: Uvula midline. Posterior oropharyngeal erythema present.   Eyes:      Conjunctiva/sclera: Conjunctivae normal.      Pupils: Pupils are equal, round, and reactive to light.   Cardiovascular:      Rate and Rhythm: Normal rate and regular rhythm.   Pulmonary:      Effort: Pulmonary effort is normal.      Breath sounds: Normal breath sounds.   Musculoskeletal:      Cervical back: Normal range of motion and neck supple.   Lymphadenopathy:      Cervical: No cervical adenopathy.   Skin:     General: Skin is warm and dry.   Neurological:      Mental Status: He is alert and oriented to person, place, and time.             ED Course   Labs Reviewed - No data to display                MDM     Please note that this report has been produced using speech recognition software and may contain errors related to that system including, but not limited to, errors in grammar, punctuation, and spelling, as well  as words and phrases that possibly may have been recognized inappropriately.  If there are any questions or concerns, contact the dictating provider for clarification.                          Medical Decision Making  Differential diagnosis includes but is not limited to: COVID-19, viral URI, strep throat, influenza, pneumonia, sinusitis, bronchitis      Presented today with 2 weeks of worsening sinus pressure congestion ear pain and cough.  Lungs were clear on exam.  Breathing nonlabored.  Will treat for poss bacterial infection due to length of time patient has been ill.  Patient given prescription for Medrol Dosepak as well as Augmentin.  Patient also given Tessalon Perles for cough.  Supportive care discussed.  Develop this primary care physician in 1 week if symptoms do not improve.  Patient verbalized understanding and agreed to plan of care.    Risk  OTC drugs.  Prescription drug management.        Disposition and Plan     Clinical Impression:  1. Sinobronchitis         Disposition:  Discharge  10/1/2024  6:02 pm    Follow-up:  Betty Almanzar DO  [address] [facility] [address] [address]  [phone]    In 1 week  As needed          Medications Prescribed:  Current Discharge Medication List        START taking these medications    Details   methylPREDNISolone (MEDROL) 4 MG Oral Tablet Therapy Pack Dosepack: take as directed  Qty: 1 each, Refills: 0      amoxicillin clavulanate 875-125 MG Oral Tab Take 1 tablet by mouth 2 (two) times daily for 7 days.  Qty: 14 tablet, Refills: 0      benzonatate 100 MG Oral Cap Take 1 capsule (100 mg total) by mouth 3 (three) times daily as needed for cough.  Qty: 30 capsule, Refills: 0                 Supplementary Documentation:

## 2024-11-03 ENCOUNTER — HOSPITAL ENCOUNTER (OUTPATIENT)
Age: 19
Discharge: HOME | End: 2024-11-03
Payer: MEDICAID

## 2024-11-03 VITALS
OXYGEN SATURATION: 98 % | HEART RATE: 74 BPM | SYSTOLIC BLOOD PRESSURE: 120 MMHG | TEMPERATURE: 97 F | DIASTOLIC BLOOD PRESSURE: 68 MMHG | RESPIRATION RATE: 16 BRPM

## 2024-11-03 DIAGNOSIS — H92.09 OTALGIA, UNSPECIFIED LATERALITY: Primary | ICD-10-CM

## 2024-11-03 DIAGNOSIS — B34.9 VIRAL ILLNESS: ICD-10-CM

## 2024-11-03 DIAGNOSIS — R09.82 PND (POST-NASAL DRIP): ICD-10-CM

## 2024-11-03 LAB — S PYO AG THROAT QL: NEGATIVE

## 2024-11-03 RX ORDER — NAPROXEN 500 MG/1
500 TABLET ORAL 2 TIMES DAILY PRN
Qty: 20 TABLET | Refills: 0 | Status: SHIPPED | OUTPATIENT
Start: 2024-11-03 | End: 2024-11-10

## 2024-11-03 NOTE — ED PROVIDER NOTES
Patient Seen in: [facility]      History     Chief Complaint   Patient presents with    Ear Problem Pain    Sore Throat     Stated Complaint: ear pain    Subjective:   HPI      19-year-old male presents today with his mom with complaints of ear pain intermittently for 1 month.  Patient states he was seen here about 1 month ago and prescribed Augmentin, steroid and benzonatate for an upper respiratory tract infection.  Mom states that Lucas did not finish the antibiotic because he started to feel better.  Objective:     No pertinent past medical history.            No pertinent past surgical history.              No pertinent social history.            Review of Systems   Constitutional: Negative.    HENT:  Positive for ear pain.    Eyes: Negative.    Respiratory: Negative.     Cardiovascular: Negative.    Gastrointestinal: Negative.    Endocrine: Negative.    Genitourinary: Negative.    Musculoskeletal: Negative.    Skin: Negative.    Allergic/Immunologic: Negative.    Neurological: Negative.    Hematological: Negative.    Psychiatric/Behavioral: Negative.         Positive for stated complaint: ear pain  Other systems are as noted in HPI.  Constitutional and vital signs reviewed.      All other systems reviewed and negative except as noted above.    Physical Exam     ED Triage Vitals [11/03/24 1447]   /68   Pulse 74   Resp 16   Temp 97.2 °F (36.2 °C)   Temp src Temporal   SpO2 98 %   O2 Device None (Room air)       Current Vitals:   Vital Signs  BP: 120/68  Pulse: 74  Resp: 16  Temp: 97.2 °F (36.2 °C)  Temp src: Temporal    Oxygen Therapy  SpO2: 98 %  O2 Device: None (Room air)        Physical Exam  Vitals and nursing note reviewed.   Constitutional:       Appearance: Normal appearance. He is normal weight.   HENT:      Head: Normocephalic.      Right Ear: Tympanic membrane, ear canal and external ear normal.      Left Ear: Tympanic membrane, ear canal and external ear normal.      Nose: Nose  normal.      Mouth/Throat:      Mouth: Mucous membranes are moist.      Pharynx: Posterior oropharyngeal erythema present.      Comments: PND noted    Eyes:      Extraocular Movements: Extraocular movements intact.      Conjunctiva/sclera: Conjunctivae normal.      Pupils: Pupils are equal, round, and reactive to light.   Cardiovascular:      Rate and Rhythm: Normal rate and regular rhythm.      Pulses: Normal pulses.      Heart sounds: Normal heart sounds.   Pulmonary:      Effort: Pulmonary effort is normal.      Breath sounds: Normal breath sounds.   Musculoskeletal:      Cervical back: Normal range of motion and neck supple.   Skin:     General: Skin is warm.   Neurological:      Mental Status: He is alert.   Psychiatric:         Mood and Affect: Mood normal.           ED Course     Labs Reviewed   POCT RAPID STREP - Normal                   MDM      19-year-old male presents today with his mom with complaints of ear pain intermittently for 1 month.  Patient states he was seen here about 1 month ago and prescribed Augmentin, steroid and benzonatate for an upper respiratory tract infection.  Mom states that Lucas did not finish the antibiotic because he started to feel better.  Vital signs: Please see EMR.  Physical exam: Please see exam.  Differential diagnosis: Otitis media, otalgia, rhinitis, strep throat, postnasal drip.  Strep (-)  Based on physical exam and HPI will diagnosed with otalgia, postnasal drip and viral illness.  Will prescribe naproxen twice daily for 7 days.  Patient instructed to increase his water intake.  ED precautions given.        Medical Decision Making  19-year-old male presents today with his mom with complaints of ear pain intermittently for 1 month.  Patient states he was seen here about 1 month ago and prescribed Augmentin, steroid and benzonatate for an upper respiratory tract infection.  Mom states that Lucas did not finish the antibiotic because he started to feel  better.    Problems Addressed:  Otalgia, unspecified laterality: acute illness or injury  PND (post-nasal drip): acute illness or injury  Viral illness: acute illness or injury    Amount and/or Complexity of Data Reviewed  Independent Historian: parent  External Data Reviewed: notes.  Labs: ordered. Decision-making details documented in ED Course.    Risk  Prescription drug management.        Disposition and Plan     Clinical Impression:  1. Otalgia, unspecified laterality    2. PND (post-nasal drip)    3. Viral illness         Disposition:  Discharge  11/3/2024  3:14 pm    Follow-up:  Betty Almanzar DO  [address] [place] [address]  [phone]    In 1 week            Medications Prescribed:  Current Discharge Medication List        START taking these medications    Details   naproxen 500 MG Oral Tab Take 1 tablet (500 mg total) by mouth 2 (two) times daily as needed.  Qty: 20 tablet, Refills: 0                 Supplementary Documentation:

## 2024-11-03 NOTE — ED INITIAL ASSESSMENT (HPI)
Patient has been sick for about a month. He has bilateral ear pain -right is worse than the left. He has an intermittent sore throat.

## 2024-11-11 ENCOUNTER — PATIENT MESSAGE (OUTPATIENT)
Dept: FAMILY MEDICINE CLINIC | Facility: CLINIC | Age: 19
End: 2024-11-11

## 2024-12-02 ENCOUNTER — TELEPHONE (OUTPATIENT)
Dept: FAMILY MEDICINE CLINIC | Facility: CLINIC | Age: 19
End: 2024-12-02

## 2024-12-02 NOTE — TELEPHONE ENCOUNTER
Spoke to patient. Patient states that he has been having chest pain for the last couple of months. Patient states he has been noticing it more the last week. Patient describes it as a throbbing discomfort by the breasts. Rating it at a 1 out of 10. Patient states that he has bumps behind his nipple. Unable to determine if the discomfort is coming from the bumps. Patient does not notice any changes. Advised patient to schedule a sooner appointment to be further evaluated.     Patient scheduled with provider on Friday, 12/6/2024. Patient will discuss with mom to see if she will be able to drive him to the appointment. If so, patient will cancel Monday, 12/9/2024 appointment with APRN. If not, patient will keep Monday's appointment and cancel Friday's appointment.    Advised patient to seek immediate medical attention if symptoms worsen and/or he develops shortness of breath, lightheaded, dizzy, etc. Patient verbalized understanding.     Future Appointments   Date Time Provider Department Center   12/6/2024 10:00 AM Betty Almanzar DO [facility] [address] [facility] [address]   12/9/2024 10:20 AM Edi Storm APRN [facility] [address] [facility] [address]

## 2024-12-02 NOTE — TELEPHONE ENCOUNTER
Received this appointment notification via [employer]:  Visit type: MYCHART EXAM (2964)   12/9/2024 10:20 AM (20 minutes) with Edi Storm in [facility] [address]      Patient comments:   Chest pain, memory issues, hair loss on leg       Ok to keep as scheduled?

## 2025-01-10 ENCOUNTER — HOSPITAL ENCOUNTER (OUTPATIENT)
Dept: MAMMOGRAPHY | Facility: HOSPITAL | Age: 20
Discharge: HOME | End: 2025-01-10
Attending: FAMILY MEDICINE
Payer: MEDICAID

## 2025-01-10 DIAGNOSIS — N64.4 BREAST PAIN: ICD-10-CM

## 2025-01-10 PROCEDURE — 76641 ULTRASOUND BREAST COMPLETE: CPT | Performed by: FAMILY MEDICINE

## 2025-08-20 ENCOUNTER — TELEMEDICINE (OUTPATIENT)
Dept: FAMILY MEDICINE CLINIC | Facility: CLINIC | Age: 20
End: 2025-08-20

## 2025-08-20 DIAGNOSIS — Z77.120 EXPOSURE TO MOLD: Primary | ICD-10-CM

## 2025-08-20 PROCEDURE — 99213 OFFICE O/P EST LOW 20 MIN: CPT | Performed by: FAMILY MEDICINE

## (undated) NOTE — ED AVS SNAPSHOT
Carlos Lara   MRN: [id number]    Department:  [facility] Emergency Department   Date of Visit:  5/27/2019           Disclosure     Insurance plans vary and the physician(s) referred by the ER may not be covered by your plan.  Please contact y tell this physician (or your personal doctor if your instructions are to return to your personal doctor) about any new or lasting problems. The primary care or specialist physician will see patients referred from the [facility] Emergency Department.  Yannick Bello

## (undated) NOTE — LETTER
[facility] CARE IN [place]  [address] [address]  Dept: [phone]  Dept Fax: [phone]         February 21, 2017    Patient: Bird Mehran   YOB: 2005   Date of Visit: 2/21/2017       To Whom It May Concern:

## (undated) NOTE — LETTER
Date & Time: 2/6/2024, 11:14 AM  Patient: Lucas Zuñiga  Encounter Provider(s):    Marcos Ambrosio MD       To Whom It May Concern:    Lucas Zuñiga was seen and treated in our department on 2/6/2024. He should be excused from school and work today.  He may return tomorrow with no restrictions.      If you have any questions or concerns, please do not hesitate to call.        _____________________________  RN Signature

## (undated) NOTE — LETTER
Date: 9/5/2023    Patient Name: Annabelle Sharif          To Whom it may concern: This letter has been written at the patient's request. The above patient was seen at the [facility] for treatment of a medical condition. This patient should be excused from attending school on 9/5/23.         Sincerely,    DIANA Hodges

## (undated) NOTE — LETTER
Date & Time: 1/24/2022, 6:54 PM  Patient: Dale Wooten  Encounter Provider(s):    DIANA Acosta       To Whom It May Concern:    Courtney Cummings was seen and treated in our department on 1/24/2022. He may return to school 1/26/2022.     If

## (undated) NOTE — ED AVS SNAPSHOT
[facility] in [place] [address] [address]    Phone:  [phone]    Fax:  [phone]   MRN: [id number]    Department:  [facility]   Date of Visit:  2/21/2017           [facility], [address]  [phone] [address] [place] [address], [facility]   [phone]       To Check ER Wait Times:  TEXT 'Grisel Guerra' to [id number]      Click [email or web address]      Or call [phone]    If you have any problems with y phone number before you leave. After you leave, you should follow the attached instructions. I have read and understand the instructions given to me by my caregivers.         [facility]        Pharmacy Address Phone Number   [phone] [employer] access allows you to view health information for your child from their recent   visit, view other health information and more. To sign up or find more information on getting   Proxy Access to your child’s [employer] go to [email or web address]

## (undated) NOTE — LETTER
Date & Time: 4/12/2022, 11:03 AM  Patient: Mayuri Learn  Encounter Provider(s):    DIANA Drake       To Whom It May Concern:    Gaviota Muñiz was seen and treated in our department on 4/12/2022. He should not return to school until 4/13/22.     If you have any questions or concerns, please do not hesitate to call.        _____________________________  Physician/APC Signature

## (undated) NOTE — LETTER
Date & Time: 2/15/2022, 6:53 PM  Patient: Dale Wooten  Encounter Provider(s):    Robert Hernandez NP       To Whom It May Concern:    Courtney Cummings was seen and treated in our department on 2/15/2022. Please excuse his absences today 2/15/22 and tomorrow 2/16/22.     If you have any questions or concerns, please do not hesitate to call.        _____________________________  Physician/APC Signature

## (undated) NOTE — LETTER
[facility] CARE IN [place]  [address] [address]  Dept: [phone]  Dept Fax: [phone]         February 15, 2018    Patient: Kinga Small   YOB: 2005   Date of Visit: 2/15/2018       To Whom It May Concern:

## (undated) NOTE — ED AVS SNAPSHOT
Parent/Legal Guardian Access to the Online [employer] Record of a Patient 15to 16Years Old  Return completed form by Secure email to [facility]/Medical Records Department: [email or web address]     Requirements and Procedures   Under [facility] MyChart ID and password with another person, that person may be able to view my or my child’s health information, and health information about someone who has authorized me as a MyChart proxy.    ·  I agree that it is my responsibility to select a confident Sign-Up Form and I agree to its terms.        Authorization Form     Please enter Patient’s information below:   Name (last, first, middle initial) __________________________________________   Gender  Male  Female    Last 4 Digits of Social Security Number Parent/Legal Guardian Signature                                  For Patient (1517 years of age)  I agree to allow my parent/legal guardian, named above, online access to my medical information currently available and that may become available as a result

## (undated) NOTE — LETTER
[facility] CARE IN [place]  [address] [address]  Dept: [phone]  Dept Fax: [phone]         January 18, 2018    Patient: Kinga Small   YOB: 2005   Date of Visit: 1/18/2018       To Whom It May Concern:

## (undated) NOTE — LETTER
[facility] CARE IN [place]  [address] [address]  Dept: [phone]  Dept Fax: [phone]         December 10, 2017    Patient: Marilu Wasserman   YOB: 2005   Date of Visit: 12/10/2017       To Whom It May Concern:

## (undated) NOTE — LETTER
[facility] CARE IN [place]  [address] [address]  Dept: [phone]  Dept Fax: [phone]         September 7, 2018    Patient: Jose James   YOB: 2005   Date of Visit: 9/7/2018       To Whom It May Concern:

## (undated) NOTE — LETTER
[facility] IN [place]  [address] [address]  Dept: [phone]  Dept Fax: [phone]      December 10, 2017    Patient: Wilbur Ramsey   Date of Visit: 12/10/2017       To Whom It May Concern:    Andrade De León was seen and